# Patient Record
Sex: MALE | Race: WHITE | NOT HISPANIC OR LATINO | Employment: FULL TIME | URBAN - METROPOLITAN AREA
[De-identification: names, ages, dates, MRNs, and addresses within clinical notes are randomized per-mention and may not be internally consistent; named-entity substitution may affect disease eponyms.]

---

## 2023-05-03 ENCOUNTER — TELEPHONE (OUTPATIENT)
Dept: PRIMARY CARE | Facility: CLINIC | Age: 58
End: 2023-05-03
Payer: COMMERCIAL

## 2023-05-03 DIAGNOSIS — Z86.19 H/O COLD SORES: Primary | ICD-10-CM

## 2023-05-03 NOTE — TELEPHONE ENCOUNTER
Has a cold sore can med be called into pharm    Rx Refill Request Telephone Encounter    Name:  Abram Garcia  :  [id number]  Medication Name:  valacyclovir HCI   Dose : 1 gm  Route :    Frequency :    Quantity :    Directions :    Specific [facility] location:  [facility]  Date of last appointment:    Date of next appointment:    Best number to reach patient:

## 2023-05-04 RX ORDER — VALACYCLOVIR HYDROCHLORIDE 1 G/1
2000 TABLET, FILM COATED ORAL 2 TIMES DAILY
Qty: 16 TABLET | Refills: 1 | Status: SHIPPED | OUTPATIENT
Start: 2023-05-04 | End: 2023-05-08

## 2023-05-15 DIAGNOSIS — M10.9 GOUT, UNSPECIFIED: ICD-10-CM

## 2023-05-15 RX ORDER — ALLOPURINOL 100 MG/1
TABLET ORAL
Qty: 90 TABLET | Refills: 3 | Status: SHIPPED | OUTPATIENT
Start: 2023-05-15 | End: 2024-04-30

## 2023-05-19 ENCOUNTER — OFFICE VISIT (OUTPATIENT)
Dept: PRIMARY CARE | Facility: CLINIC | Age: 58
End: 2023-05-19
Payer: COMMERCIAL

## 2023-05-19 VITALS
HEART RATE: 70 BPM | SYSTOLIC BLOOD PRESSURE: 169 MMHG | HEIGHT: 72 IN | OXYGEN SATURATION: 98 % | WEIGHT: 258 LBS | DIASTOLIC BLOOD PRESSURE: 94 MMHG | BODY MASS INDEX: 34.95 KG/M2

## 2023-05-19 DIAGNOSIS — B00.1 RECURRENT COLD SORES: Primary | ICD-10-CM

## 2023-05-19 DIAGNOSIS — Z12.5 PROSTATE CANCER SCREENING: ICD-10-CM

## 2023-05-19 DIAGNOSIS — I10 ESSENTIAL HYPERTENSION: ICD-10-CM

## 2023-05-19 DIAGNOSIS — M10.00 IDIOPATHIC GOUT, UNSPECIFIED CHRONICITY, UNSPECIFIED SITE: ICD-10-CM

## 2023-05-19 DIAGNOSIS — N48.1 BALANITIS: ICD-10-CM

## 2023-05-19 PROCEDURE — 3080F DIAST BP >= 90 MM HG: CPT | Performed by: FAMILY MEDICINE

## 2023-05-19 PROCEDURE — 3077F SYST BP >= 140 MM HG: CPT | Performed by: FAMILY MEDICINE

## 2023-05-19 PROCEDURE — 1036F TOBACCO NON-USER: CPT | Performed by: FAMILY MEDICINE

## 2023-05-19 PROCEDURE — 99214 OFFICE O/P EST MOD 30 MIN: CPT | Performed by: FAMILY MEDICINE

## 2023-05-19 RX ORDER — NYSTATIN 100000 U/G
CREAM TOPICAL 2 TIMES DAILY
Qty: 30 G | Refills: 1 | Status: SHIPPED | OUTPATIENT
Start: 2023-05-19 | End: 2023-05-26

## 2023-05-19 RX ORDER — VALACYCLOVIR HYDROCHLORIDE 500 MG/1
500 TABLET, FILM COATED ORAL DAILY
Qty: 90 TABLET | Refills: 0 | Status: SHIPPED | OUTPATIENT
Start: 2023-05-19 | End: 2023-08-15

## 2023-05-19 RX ORDER — LISINOPRIL 10 MG/1
1 TABLET ORAL DAILY
COMMUNITY
Start: 2022-05-31 | End: 2023-10-10

## 2023-05-19 RX ORDER — VALACYCLOVIR HYDROCHLORIDE 1 G/1
TABLET, FILM COATED ORAL
COMMUNITY
Start: 2021-10-06 | End: 2023-05-19 | Stop reason: DRUGHIGH

## 2023-05-19 RX ORDER — FLUTICASONE PROPIONATE 50 MCG
1 SPRAY, SUSPENSION (ML) NASAL DAILY
COMMUNITY

## 2023-05-19 NOTE — PROGRESS NOTES
57-year-old male with history of occasional cold sores responsive to Valtrex 2 g twice a day as needed over the past several months has had more cold sore recurrences and is considering daily prophylactic therapy.  He also exercises and plays pickle ball frequently, he comes home in a full sweat sits down and does some work thereby delaying his shower.  Noticed a rash over his penile shaft and glans penis which is pruritic.  Denies ulcerations or vesicles.  Also concerned about a flat brown well-circumscribed lesion left medial distal lower extremity  He is somewhat anxious over this today.  Denies chest pain shortness of breath headache  BP (!) 169/94   Pulse 70   Ht 1.829 m (6')   Wt 117 kg (258 lb)   SpO2 98%   BMI 34.99 kg/m²   Actinic keratosis left medial distal lower extremity  Oropharynx normal no ulcerations  Penile shaft with patch of erythema without ulcerations vesicles sores   Urethra normal no inguinal adenopathy  Neuro intact

## 2023-05-19 NOTE — PATIENT INSTRUCTIONS
Start Valtrex 500 mg daily   Use nystatin cream  Exercise weight reduction discussed  Schedule follow-up for physical and labs  Monitor blood pressure at home

## 2023-05-22 ENCOUNTER — TELEPHONE (OUTPATIENT)
Dept: PRIMARY CARE | Facility: CLINIC | Age: 58
End: 2023-05-22

## 2023-06-13 ENCOUNTER — APPOINTMENT (OUTPATIENT)
Dept: PRIMARY CARE | Facility: CLINIC | Age: 58
End: 2023-06-13
Payer: COMMERCIAL

## 2023-08-15 DIAGNOSIS — B00.1 RECURRENT COLD SORES: ICD-10-CM

## 2023-08-15 RX ORDER — VALACYCLOVIR HYDROCHLORIDE 500 MG/1
500 TABLET, FILM COATED ORAL DAILY
Qty: 90 TABLET | Refills: 0 | Status: SHIPPED | OUTPATIENT
Start: 2023-08-15 | End: 2023-11-13

## 2023-10-06 DIAGNOSIS — I10 ESSENTIAL HYPERTENSION: Primary | ICD-10-CM

## 2023-10-10 RX ORDER — LISINOPRIL 10 MG/1
10 TABLET ORAL DAILY
Qty: 90 TABLET | Refills: 1 | Status: SHIPPED | OUTPATIENT
Start: 2023-10-10 | End: 2024-03-12

## 2023-11-11 DIAGNOSIS — B00.1 RECURRENT COLD SORES: ICD-10-CM

## 2023-11-13 RX ORDER — VALACYCLOVIR HYDROCHLORIDE 500 MG/1
500 TABLET, FILM COATED ORAL DAILY
Qty: 90 TABLET | Refills: 0 | Status: SHIPPED | OUTPATIENT
Start: 2023-11-13 | End: 2024-02-09

## 2023-11-17 ENCOUNTER — OFFICE VISIT (OUTPATIENT)
Dept: URGENT CARE | Facility: CLINIC | Age: 58
End: 2023-11-17
Payer: COMMERCIAL

## 2023-11-17 VITALS
RESPIRATION RATE: 18 BRPM | TEMPERATURE: 97.9 F | WEIGHT: 260 LBS | BODY MASS INDEX: 35.26 KG/M2 | OXYGEN SATURATION: 96 %

## 2023-11-17 DIAGNOSIS — S63.512A SPRAIN OF CARPAL JOINT OF LEFT WRIST, INITIAL ENCOUNTER: Primary | ICD-10-CM

## 2023-11-17 DIAGNOSIS — M25.532 LEFT WRIST PAIN: ICD-10-CM

## 2023-11-17 DIAGNOSIS — M77.8 TENDINITIS OF LEFT WRIST: ICD-10-CM

## 2023-11-17 DIAGNOSIS — S66.912A MUSCLE STRAIN OF LEFT WRIST, INITIAL ENCOUNTER: ICD-10-CM

## 2023-11-17 PROCEDURE — 1036F TOBACCO NON-USER: CPT | Performed by: FAMILY MEDICINE

## 2023-11-17 PROCEDURE — 99213 OFFICE O/P EST LOW 20 MIN: CPT | Performed by: FAMILY MEDICINE

## 2023-11-17 ASSESSMENT — ENCOUNTER SYMPTOMS
HEADACHES: 0
FATIGUE: 0
VOMITING: 0
CHILLS: 0
WHEEZING: 0
ABDOMINAL PAIN: 0
SHORTNESS OF BREATH: 0
RHINORRHEA: 0
DIARRHEA: 0
CONSTIPATION: 0
NAUSEA: 0
SORE THROAT: 0
COUGH: 0

## 2023-11-17 ASSESSMENT — PAIN SCALES - GENERAL: PAINLEVEL: 9

## 2023-11-17 NOTE — PATIENT INSTRUCTIONS
You have a left wrist sprain.  You have left wrist strain and tendinitis.  Please apply cold compresses to affected area for 20-30 minutes daily to reduce swelling.  May take Tylenol (acetaminophen) 325 mg, 2 tablets by mouth every 4-6 hours as needed for discomfort.   May take Motrin or Advil (ibuprofen) 200 mg, 3 tablets by mouth every 8 hours as needed for discomfort. Please follow-up with primary care provider in 7-10 days  If not continuing to improve  May place hands in warm water (warm as warm as you can comfortably tolerate) for 2 to 3 minutes to help relieve stiffness  May use wrist support while exerting the arm.  May resume exercise activities as tolerated.  If you have increased pain, redness, swelling, numbness, tingling, weakness return to urgent care or go to emergency department for further evaluation  Please follow-up with primary care physician or orthopedics in the next 7 to 10 days if you do not continue to see day by day gradual improvement.     This note was generated by voice recognition software. Minor transcription/grammatical errors may be present. Please call for clarification.

## 2023-11-17 NOTE — PROGRESS NOTES
"Subjective   Patient ID: Abram Garcia is a 57 y.o. male.     57-year-old  male presents with a complaint of pain in left wrist.  He reports that he had sudden onset of pain and a feeling of something \"popping\" in his left wrist as he was lifting bags of leaves in to the trash truck picking up in front of his yard.  He states he was helping the  as he had over 40 bags of leaves to move.      Wrist Pain  Associated symptoms: myalgias    Associated symptoms: no abdominal pain, no congestion, no cough, no diarrhea, no ear pain, no fatigue, no headaches, no nausea, no rhinorrhea, no shortness of breath, no sore throat, no vomiting and no wheezing        The following portions of the chart were reviewed this encounter and updated as appropriate:  Tobacco  Allergies  Meds  Problems  Med Hx  Surg Hx  Fam Hx         Review of Systems   Constitutional:  Negative for chills and fatigue.   HENT:  Negative for congestion, ear pain, rhinorrhea, sneezing and sore throat.    Respiratory:  Negative for cough, shortness of breath and wheezing.    Gastrointestinal:  Negative for abdominal pain, constipation, diarrhea, nausea and vomiting.   Musculoskeletal:  Positive for joint swelling and myalgias.   Neurological:  Negative for headaches.     Objective   Physical Exam  Vital signs are reviewed. Alert and oriented x3 with normal mood and affect  Patient is well nourished, well-developed, alert and in no acute distress    External eyes, orbits, conjunctiva and eyelids are normal in appearance  Pupils are equal, round, reactive to light and accommodation, extraocular movements intact    External ears appear normal  External canals are normal in appearance  Right tympanic membrane is intact and pale gray in appearance  Left tympanic membrane is intact and pale gray in appearance  There is no middle ear effusion noted on the right  There is no middle ear effusion noted on the left  External appearance of the nose " is normal  Nasal mucosa, septum, turbinates are  mildly reddened in appearance  There is  thin white nasal discharge in both nares    Oral mucosa is uniformly pink and moist  Palate is pink, symmetric and intact  Tongue is moist, mobile and midline   posterior pharynxnot erythematous with no concretions or exudates present  No cervical lymphadenopathy palpated    Heart has regular rate and rhythm. No murmurs, rubs or gallops are auscultated at this exam.    Respiratory rate rhythm and effort are normal. Breath sounds bilaterally are clear on auscultation without crackles, rhonchi, wheezes or friction rub.    Abdomen: Normal bowel sounds on auscultation. Soft, nontender without rebound or rigidity on palpation    Extremities: Left wrist: Patient has pain over the area of the ulnar collateral ligament and triangular cartilage as well as pain in the extensor and flexor tendons of the wrist.  Also reports some tenderness over the radial collateral ligament and abductor of the thumb.      Reviewed x-ray of left wrist no acute fracture or dislocation noted.  There appears to be an old distal radius fracture at the radial styloid.      Procedures    Assessment/Plan   Diagnoses and all orders for this visit:  Sprain of carpal joint of left wrist, initial encounter  Left wrist pain  -     XR wrist left 3+ views; Future  Muscle strain of left wrist, initial encounter  Tendinitis of left wrist

## 2023-11-20 ASSESSMENT — ENCOUNTER SYMPTOMS
JOINT SWELLING: 1
MYALGIAS: 1

## 2024-02-09 DIAGNOSIS — B00.1 RECURRENT COLD SORES: ICD-10-CM

## 2024-02-09 RX ORDER — VALACYCLOVIR HYDROCHLORIDE 500 MG/1
500 TABLET, FILM COATED ORAL DAILY
Qty: 90 TABLET | Refills: 0 | Status: SHIPPED | OUTPATIENT
Start: 2024-02-09 | End: 2024-05-08

## 2024-03-12 DIAGNOSIS — I10 ESSENTIAL HYPERTENSION: ICD-10-CM

## 2024-03-12 RX ORDER — LISINOPRIL 10 MG/1
10 TABLET ORAL DAILY
Qty: 30 TABLET | Refills: 0 | Status: SHIPPED | OUTPATIENT
Start: 2024-03-12 | End: 2024-04-25

## 2024-04-24 DIAGNOSIS — I10 ESSENTIAL HYPERTENSION: ICD-10-CM

## 2024-04-25 RX ORDER — LISINOPRIL 10 MG/1
10 TABLET ORAL DAILY
Qty: 90 TABLET | Refills: 0 | Status: SHIPPED | OUTPATIENT
Start: 2024-04-25

## 2024-04-30 DIAGNOSIS — M10.9 GOUT, UNSPECIFIED: ICD-10-CM

## 2024-04-30 RX ORDER — ALLOPURINOL 100 MG/1
TABLET ORAL
Qty: 90 TABLET | Refills: 3 | Status: SHIPPED | OUTPATIENT
Start: 2024-04-30

## 2024-05-07 ENCOUNTER — APPOINTMENT (OUTPATIENT)
Dept: PRIMARY CARE | Facility: CLINIC | Age: 59
End: 2024-05-07
Payer: COMMERCIAL

## 2024-05-08 DIAGNOSIS — B00.1 RECURRENT COLD SORES: ICD-10-CM

## 2024-05-08 RX ORDER — VALACYCLOVIR HYDROCHLORIDE 500 MG/1
500 TABLET, FILM COATED ORAL DAILY
Qty: 90 TABLET | Refills: 0 | Status: SHIPPED | OUTPATIENT
Start: 2024-05-08

## 2024-06-04 ENCOUNTER — APPOINTMENT (OUTPATIENT)
Dept: PRIMARY CARE | Facility: CLINIC | Age: 59
End: 2024-06-04
Payer: COMMERCIAL

## 2024-07-04 DIAGNOSIS — I10 ESSENTIAL HYPERTENSION: ICD-10-CM

## 2024-07-05 RX ORDER — LISINOPRIL 10 MG/1
10 TABLET ORAL DAILY
Qty: 90 TABLET | Refills: 0 | Status: SHIPPED | OUTPATIENT
Start: 2024-07-05

## 2024-07-05 NOTE — TELEPHONE ENCOUNTER
L/M for patient to call back to schedule PE per Dr. Russo and let him know his script was sent to pharmacy.

## 2024-08-09 ENCOUNTER — APPOINTMENT (OUTPATIENT)
Dept: PRIMARY CARE | Facility: CLINIC | Age: 59
End: 2024-08-09
Payer: COMMERCIAL

## 2024-08-20 ENCOUNTER — APPOINTMENT (OUTPATIENT)
Dept: PRIMARY CARE | Facility: CLINIC | Age: 59
End: 2024-08-20
Payer: COMMERCIAL

## 2024-08-20 VITALS
WEIGHT: 260 LBS | HEART RATE: 79 BPM | DIASTOLIC BLOOD PRESSURE: 89 MMHG | SYSTOLIC BLOOD PRESSURE: 160 MMHG | HEIGHT: 72 IN | BODY MASS INDEX: 35.21 KG/M2 | TEMPERATURE: 97.2 F | OXYGEN SATURATION: 98 %

## 2024-08-20 DIAGNOSIS — Z00.00 ROUTINE ADULT HEALTH MAINTENANCE: Primary | ICD-10-CM

## 2024-08-20 DIAGNOSIS — M10.00 IDIOPATHIC GOUT, UNSPECIFIED CHRONICITY, UNSPECIFIED SITE: ICD-10-CM

## 2024-08-20 DIAGNOSIS — I10 ESSENTIAL HYPERTENSION: ICD-10-CM

## 2024-08-20 DIAGNOSIS — M10.9 GOUT, UNSPECIFIED CAUSE, UNSPECIFIED CHRONICITY, UNSPECIFIED SITE: ICD-10-CM

## 2024-08-20 DIAGNOSIS — M77.8 TENDINITIS OF LEFT WRIST: ICD-10-CM

## 2024-08-20 DIAGNOSIS — Z12.5 PROSTATE CANCER SCREENING: ICD-10-CM

## 2024-08-20 PROCEDURE — 3077F SYST BP >= 140 MM HG: CPT | Performed by: FAMILY MEDICINE

## 2024-08-20 PROCEDURE — 99396 PREV VISIT EST AGE 40-64: CPT | Performed by: FAMILY MEDICINE

## 2024-08-20 PROCEDURE — 3008F BODY MASS INDEX DOCD: CPT | Performed by: FAMILY MEDICINE

## 2024-08-20 PROCEDURE — 3079F DIAST BP 80-89 MM HG: CPT | Performed by: FAMILY MEDICINE

## 2024-08-20 RX ORDER — LISINOPRIL 10 MG/1
20 TABLET ORAL DAILY
Start: 2024-08-20

## 2024-08-20 RX ORDER — INDOMETHACIN 50 MG/1
50 CAPSULE ORAL
Qty: 21 CAPSULE | Refills: 2 | Status: SHIPPED | OUTPATIENT
Start: 2024-08-20

## 2024-08-20 RX ORDER — LISINOPRIL 20 MG/1
20 TABLET ORAL DAILY
Qty: 90 TABLET | Refills: 3 | Status: SHIPPED | OUTPATIENT
Start: 2024-08-20 | End: 2024-08-20

## 2024-08-20 ASSESSMENT — COLUMBIA-SUICIDE SEVERITY RATING SCALE - C-SSRS
2. HAVE YOU ACTUALLY HAD ANY THOUGHTS OF KILLING YOURSELF?: NO
1. IN THE PAST MONTH, HAVE YOU WISHED YOU WERE DEAD OR WISHED YOU COULD GO TO SLEEP AND NOT WAKE UP?: NO
6. HAVE YOU EVER DONE ANYTHING, STARTED TO DO ANYTHING, OR PREPARED TO DO ANYTHING TO END YOUR LIFE?: NO

## 2024-08-20 ASSESSMENT — LIFESTYLE VARIABLES: HOW OFTEN DO YOU HAVE A DRINK CONTAINING ALCOHOL: 2-3 TIMES A WEEK

## 2024-08-20 ASSESSMENT — PATIENT HEALTH QUESTIONNAIRE - PHQ9
1. LITTLE INTEREST OR PLEASURE IN DOING THINGS: NOT AT ALL
SUM OF ALL RESPONSES TO PHQ9 QUESTIONS 1 AND 2: 0
2. FEELING DOWN, DEPRESSED OR HOPELESS: NOT AT ALL

## 2024-08-20 NOTE — PROGRESS NOTES
C/o left wrist pain    ROS :   Denies fevers chills sweats malaise fatigue  Denies headache dysarthria aphasia focal weakness  Denies neck pain stiffness swollen glands sore throat otalgia otorrhea facial pressure  Denies chest pain shortness of breath diaphoresis nausea palpitations syncope presyncope dyspnea on exertion orthopnea cough  Denies abdominal pain nausea vomiting heartburn constipation diarrhea stool changes melena hematochezia  Denies urinary frequency dysuria hematuria pyuria hesitancy dribbling nocturia hematuria   Denies scrotal pain testicular mass erectile dysfunction  Denies numbness tingling weakness ataxia loss of dexterity  Denies depression anxiety insomnia      PE:    /89   Pulse 79   Temp 36.2 °C (97.2 °F)   Ht 1.829 m (6')   Wt 118 kg (260 lb)   SpO2 98%   BMI 35.26 kg/m²      Appears comfortable  Not ill-appearing  Normocephalic atraumatic  Respirations normal  No retractions  Skin  without pallor petechia icterus cyanosis  Neck without JVD thyromegaly bruits adenopathy  Chest clear to auscultation without rales rhonchi wheeze  Heart regular rate and rhythm without murmur  Abdomen soft nondistended nontender without organomegaly or mass  Rectal without mass or heme  Prostate normal without enlargement nodularity bogginess asymmetry or mass  Extremities without erythema edema Homans or cord  Peripheral pulses palpable  Neuro cranial nerves II through XII intact   no sensory  motor loss or tremor  Gait normal   Affect normal  Does not appear anxious or depressed

## 2024-10-01 ENCOUNTER — APPOINTMENT (OUTPATIENT)
Dept: PRIMARY CARE | Facility: CLINIC | Age: 59
End: 2024-10-01
Payer: COMMERCIAL

## 2024-11-22 ENCOUNTER — TELEPHONE (OUTPATIENT)
Dept: PRIMARY CARE | Facility: CLINIC | Age: 59
End: 2024-11-22
Payer: COMMERCIAL

## 2024-11-22 DIAGNOSIS — I10 ESSENTIAL HYPERTENSION: ICD-10-CM

## 2024-11-22 NOTE — TELEPHONE ENCOUNTER
Rx Refill Request Telephone Encounter    Name:  Abram Garcia  :  [id number]  Medication Name:  LISINOPRIL   10 MG           Specific Pharmacy location:  [facility]   Date of last appointment:  2024  Date of next appointment:  N/A  Best number to reach patient:

## 2024-11-25 RX ORDER — LISINOPRIL 10 MG/1
20 TABLET ORAL DAILY
Qty: 90 TABLET | Refills: 0 | Status: SHIPPED | OUTPATIENT
Start: 2024-11-25

## 2025-01-02 DIAGNOSIS — I10 ESSENTIAL HYPERTENSION: ICD-10-CM

## 2025-01-02 RX ORDER — LISINOPRIL 10 MG/1
20 TABLET ORAL DAILY
Qty: 180 TABLET | Refills: 1 | Status: SHIPPED | OUTPATIENT
Start: 2025-01-02

## 2025-01-27 DIAGNOSIS — M10.9 GOUT, UNSPECIFIED: ICD-10-CM

## 2025-01-27 RX ORDER — ALLOPURINOL 100 MG/1
TABLET ORAL
Qty: 90 TABLET | Refills: 3 | Status: SHIPPED | OUTPATIENT
Start: 2025-01-27

## 2025-06-12 DIAGNOSIS — I10 ESSENTIAL HYPERTENSION: ICD-10-CM

## 2025-06-12 RX ORDER — LISINOPRIL 10 MG/1
20 TABLET ORAL DAILY
Qty: 180 TABLET | Refills: 1 | Status: SHIPPED | OUTPATIENT
Start: 2025-06-12

## 2025-08-22 ENCOUNTER — HOSPITAL ENCOUNTER (OUTPATIENT)
Dept: RADIOLOGY | Facility: CLINIC | Age: 60
Discharge: HOME | End: 2025-08-22
Payer: COMMERCIAL

## 2025-08-22 ENCOUNTER — APPOINTMENT (OUTPATIENT)
Dept: PRIMARY CARE | Facility: CLINIC | Age: 60
End: 2025-08-22
Payer: COMMERCIAL

## 2025-08-22 VITALS
HEART RATE: 76 BPM | HEIGHT: 72 IN | SYSTOLIC BLOOD PRESSURE: 152 MMHG | TEMPERATURE: 97.2 F | BODY MASS INDEX: 35.49 KG/M2 | WEIGHT: 262 LBS | DIASTOLIC BLOOD PRESSURE: 86 MMHG | OXYGEN SATURATION: 98 %

## 2025-08-22 DIAGNOSIS — M77.8 TENDONITIS OF WRIST, RIGHT: ICD-10-CM

## 2025-08-22 DIAGNOSIS — M10.9 GOUT, UNSPECIFIED CAUSE, UNSPECIFIED CHRONICITY, UNSPECIFIED SITE: ICD-10-CM

## 2025-08-22 DIAGNOSIS — Z12.5 PROSTATE CANCER SCREENING: ICD-10-CM

## 2025-08-22 DIAGNOSIS — Z00.00 ROUTINE ADULT HEALTH MAINTENANCE: Primary | ICD-10-CM

## 2025-08-22 DIAGNOSIS — L82.1 SEBORRHEIC KERATOSIS: ICD-10-CM

## 2025-08-22 DIAGNOSIS — I10 ESSENTIAL HYPERTENSION: ICD-10-CM

## 2025-08-22 PROCEDURE — 73110 X-RAY EXAM OF WRIST: CPT | Mod: RT

## 2025-08-22 PROCEDURE — 90677 PCV20 VACCINE IM: CPT | Performed by: FAMILY MEDICINE

## 2025-08-22 PROCEDURE — 17110 DESTRUCTION B9 LES UP TO 14: CPT | Performed by: FAMILY MEDICINE

## 2025-08-22 PROCEDURE — 99396 PREV VISIT EST AGE 40-64: CPT | Performed by: FAMILY MEDICINE

## 2025-08-22 PROCEDURE — 3008F BODY MASS INDEX DOCD: CPT | Performed by: FAMILY MEDICINE

## 2025-08-22 PROCEDURE — 3077F SYST BP >= 140 MM HG: CPT | Performed by: FAMILY MEDICINE

## 2025-08-22 PROCEDURE — 90471 IMMUNIZATION ADMIN: CPT | Performed by: FAMILY MEDICINE

## 2025-08-22 PROCEDURE — 3079F DIAST BP 80-89 MM HG: CPT | Performed by: FAMILY MEDICINE

## 2025-08-22 RX ORDER — LISINOPRIL 40 MG/1
40 TABLET ORAL DAILY
Qty: 90 TABLET | Refills: 1 | Status: SHIPPED | OUTPATIENT
Start: 2025-08-22 | End: 2026-09-26

## 2025-08-22 ASSESSMENT — PATIENT HEALTH QUESTIONNAIRE - PHQ9
SUM OF ALL RESPONSES TO PHQ9 QUESTIONS 1 & 2: 0
1. LITTLE INTEREST OR PLEASURE IN DOING THINGS: NOT AT ALL
2. FEELING DOWN, DEPRESSED OR HOPELESS: NOT AT ALL

## 2025-08-22 ASSESSMENT — LIFESTYLE VARIABLES
HOW OFTEN DO YOU HAVE SIX OR MORE DRINKS ON ONE OCCASION: NEVER
HOW MANY STANDARD DRINKS CONTAINING ALCOHOL DO YOU HAVE ON A TYPICAL DAY: 1 OR 2
SKIP TO QUESTIONS 9-10: 1
AUDIT-C TOTAL SCORE: 4
HOW OFTEN DO YOU HAVE A DRINK CONTAINING ALCOHOL: 4 OR MORE TIMES A WEEK